# Patient Record
Sex: FEMALE
[De-identification: names, ages, dates, MRNs, and addresses within clinical notes are randomized per-mention and may not be internally consistent; named-entity substitution may affect disease eponyms.]

---

## 2023-04-05 PROBLEM — Z00.00 ENCOUNTER FOR PREVENTIVE HEALTH EXAMINATION: Status: ACTIVE | Noted: 2023-04-05

## 2023-04-13 ENCOUNTER — APPOINTMENT (OUTPATIENT)
Dept: OTOLARYNGOLOGY | Facility: CLINIC | Age: 23
End: 2023-04-13
Payer: MEDICAID

## 2023-04-13 VITALS
OXYGEN SATURATION: 98 % | BODY MASS INDEX: 29.27 KG/M2 | HEIGHT: 61 IN | SYSTOLIC BLOOD PRESSURE: 137 MMHG | WEIGHT: 155 LBS | TEMPERATURE: 97.9 F | HEART RATE: 106 BPM | DIASTOLIC BLOOD PRESSURE: 83 MMHG

## 2023-04-13 DIAGNOSIS — H61.23 IMPACTED CERUMEN, BILATERAL: ICD-10-CM

## 2023-04-13 PROCEDURE — 99203 OFFICE O/P NEW LOW 30 MIN: CPT | Mod: 25

## 2023-04-13 RX ORDER — CIPROFLOXACIN AND DEXAMETHASONE 3; 1 MG/ML; MG/ML
0.3-0.1 SUSPENSION/ DROPS AURICULAR (OTIC)
Qty: 1 | Refills: 2 | Status: ACTIVE | COMMUNITY
Start: 2023-04-13 | End: 1900-01-01

## 2023-04-13 RX ORDER — CIPROFLOXACIN 3 MG/ML
0.3 SOLUTION OPHTHALMIC
Qty: 1 | Refills: 2 | Status: ACTIVE | COMMUNITY
Start: 2023-04-13 | End: 1900-01-01

## 2023-04-13 RX ORDER — DEXAMETHASONE SODIUM PHOSPHATE 1 MG/ML
0.1 SOLUTION/ DROPS OPHTHALMIC
Qty: 1 | Refills: 1 | Status: ACTIVE | COMMUNITY
Start: 2023-04-13 | End: 1900-01-01

## 2023-04-13 NOTE — ASSESSMENT
[FreeTextEntry1] : 23 year old female with history of presents with right otorrhea. On exam, the right EAC is erythematous and edematous with evidence of debris, which was removed without issue, consistent with otitis externa. Left ear was normal.  At this time, I recommend Ciprodex for 10 days as well as avoidance of Q-tips.  Our otitis externa handout was reviewed and provided to the patient.  Follow-up in 2 weeks for repeat evaluation.\par \par - Ciprodex to RIGHT ear x 10 days\par - Otitis externa handout provided \par - fu 2 weeks \par

## 2023-04-13 NOTE — REASON FOR VISIT
[Initial Consultation] : an initial consultation for [FreeTextEntry2] : right ear drainage, itchiness

## 2023-04-13 NOTE — HISTORY OF PRESENT ILLNESS
[de-identified] : 4/13/23\par 23F with history of bilateral hearing loss since age 9 (cannot hear anything on left ear, wears hearing aid on the right) presents with right otorrhea, dryness and itchiness for 6 months. Symptoms have been intermittent. Drainage is clear. She does report right sided tinnitus that is a high pitched, intermittent and nonpulsatile. Denies otalgia or vertiginous symptoms. Last hearing test was 2 years ago. Never had imaging done. Seen by urgent care and given oral antibiotics and otic drops. Saw ENT in [facility] and given antibiotic/steroid drops for 2 weeks, started feeling better but still with persistent otorrhea on right. No other ENT issues.

## 2023-04-13 NOTE — PROCEDURE
[FreeTextEntry3] : -\par Cerumen Removal/Ear Cleaning for Otitis Externa\par Pre-operative Diagnosis: right otitis externa\par Post-operative Diagnosis: Same\par Procedure: Binocular microscopy with cerumen removal- 17585\par Procedure Details: \par The patient was placed in the supine position. The operating microscope was positioned. I then placed the ear speculum in the EAC. Cerumen was then removed using a mixture of otologic curettes, and suction. The TM was noted to be intact. The patient tolerated procedure well.\par \par Findings: \par Left Ear Canal - normal\par Right Ear Canal - w/ evidence of OE \par Bilateral Tympanic Membrane - normal\par \par Recommendations: Debrox\par Complications: None\par \par

## 2023-04-13 NOTE — PHYSICAL EXAM
[Midline] : trachea located in midline position [Normal] : no rashes [de-identified] : right EAC w/ evidence of white debris, removed without issue and consistent with OE, left normal

## 2023-05-11 ENCOUNTER — APPOINTMENT (OUTPATIENT)
Dept: OTOLARYNGOLOGY | Facility: CLINIC | Age: 23
End: 2023-05-11

## 2023-07-21 ENCOUNTER — APPOINTMENT (OUTPATIENT)
Dept: OTOLARYNGOLOGY | Facility: CLINIC | Age: 23
End: 2023-07-21

## 2023-09-15 ENCOUNTER — APPOINTMENT (OUTPATIENT)
Dept: OTOLARYNGOLOGY | Facility: CLINIC | Age: 23
End: 2023-09-15
Payer: MEDICAID

## 2023-09-15 VITALS
OXYGEN SATURATION: 98 % | TEMPERATURE: 98.6 F | SYSTOLIC BLOOD PRESSURE: 126 MMHG | DIASTOLIC BLOOD PRESSURE: 86 MMHG | HEART RATE: 81 BPM

## 2023-09-15 DIAGNOSIS — H60.90 UNSPECIFIED OTITIS EXTERNA, UNSPECIFIED EAR: ICD-10-CM

## 2023-09-15 DIAGNOSIS — H92.11 OTORRHEA, RIGHT EAR: ICD-10-CM

## 2023-09-15 DIAGNOSIS — H61.21 IMPACTED CERUMEN, RIGHT EAR: ICD-10-CM

## 2023-09-15 PROCEDURE — 99214 OFFICE O/P EST MOD 30 MIN: CPT | Mod: 25

## 2023-09-15 RX ORDER — CIPROFLOXACIN AND DEXAMETHASONE 3; 1 MG/ML; MG/ML
0.3-0.1 SUSPENSION/ DROPS AURICULAR (OTIC) TWICE DAILY
Qty: 2 | Refills: 1 | Status: ACTIVE | COMMUNITY
Start: 2023-09-15 | End: 1900-01-01

## 2024-06-06 ENCOUNTER — APPOINTMENT (OUTPATIENT)
Dept: OTOLARYNGOLOGY | Facility: CLINIC | Age: 24
End: 2024-06-06